# Patient Record
Sex: MALE | Employment: FULL TIME | URBAN - METROPOLITAN AREA
[De-identification: names, ages, dates, MRNs, and addresses within clinical notes are randomized per-mention and may not be internally consistent; named-entity substitution may affect disease eponyms.]

---

## 2020-01-06 ENCOUNTER — TELEPHONE (OUTPATIENT)
Dept: GASTROENTEROLOGY | Facility: CLINIC | Age: 55
End: 2020-01-06

## 2020-01-06 NOTE — TELEPHONE ENCOUNTER
----- Message from Marlon Jain RN sent at 2/18/2016  2:11 PM CST -----  Regarding: recall colon  Recall colon in 5 years per GS.  Colon done 2/6/15

## 2020-02-25 ENCOUNTER — OFFICE VISIT (OUTPATIENT)
Dept: FAMILY MEDICINE CLINIC | Facility: CLINIC | Age: 55
End: 2020-02-25

## 2020-02-25 ENCOUNTER — LAB ENCOUNTER (OUTPATIENT)
Dept: LAB | Age: 55
End: 2020-02-25
Attending: FAMILY MEDICINE
Payer: COMMERCIAL

## 2020-02-25 ENCOUNTER — APPOINTMENT (OUTPATIENT)
Dept: LAB | Age: 55
End: 2020-02-25
Attending: FAMILY MEDICINE
Payer: COMMERCIAL

## 2020-02-25 VITALS
DIASTOLIC BLOOD PRESSURE: 78 MMHG | HEIGHT: 67 IN | TEMPERATURE: 98 F | HEART RATE: 76 BPM | BODY MASS INDEX: 28.16 KG/M2 | WEIGHT: 179.38 LBS | SYSTOLIC BLOOD PRESSURE: 124 MMHG

## 2020-02-25 DIAGNOSIS — Z00.00 PHYSICAL EXAM: ICD-10-CM

## 2020-02-25 DIAGNOSIS — Z12.11 COLON CANCER SCREENING: ICD-10-CM

## 2020-02-25 DIAGNOSIS — I83.93 VARICOSE VEINS OF BOTH LOWER EXTREMITIES, UNSPECIFIED WHETHER COMPLICATED: ICD-10-CM

## 2020-02-25 DIAGNOSIS — Z00.00 PHYSICAL EXAM: Primary | ICD-10-CM

## 2020-02-25 LAB
ALBUMIN SERPL-MCNC: 4.1 G/DL (ref 3.4–5)
ALBUMIN/GLOB SERPL: 1.2 {RATIO} (ref 1–2)
ALP LIVER SERPL-CCNC: 61 U/L (ref 45–117)
ALT SERPL-CCNC: 43 U/L (ref 16–61)
ANION GAP SERPL CALC-SCNC: 4 MMOL/L (ref 0–18)
AST SERPL-CCNC: 23 U/L (ref 15–37)
BACTERIA UR QL AUTO: NEGATIVE /HPF
BASOPHILS # BLD AUTO: 0.02 X10(3) UL (ref 0–0.2)
BASOPHILS NFR BLD AUTO: 0.4 %
BILIRUB SERPL-MCNC: 1.9 MG/DL (ref 0.1–2)
BILIRUB UR QL: NEGATIVE
BUN BLD-MCNC: 17 MG/DL (ref 7–18)
BUN/CREAT SERPL: 14 (ref 10–20)
CALCIUM BLD-MCNC: 9.7 MG/DL (ref 8.5–10.1)
CHLORIDE SERPL-SCNC: 107 MMOL/L (ref 98–112)
CHOLEST SMN-MCNC: 201 MG/DL (ref ?–200)
CO2 SERPL-SCNC: 29 MMOL/L (ref 21–32)
COLOR UR: YELLOW
CREAT BLD-MCNC: 1.21 MG/DL (ref 0.7–1.3)
DEPRECATED RDW RBC AUTO: 41.6 FL (ref 35.1–46.3)
EOSINOPHIL # BLD AUTO: 0.07 X10(3) UL (ref 0–0.7)
EOSINOPHIL NFR BLD AUTO: 1.4 %
ERYTHROCYTE [DISTWIDTH] IN BLOOD BY AUTOMATED COUNT: 12.1 % (ref 11–15)
EST. AVERAGE GLUCOSE BLD GHB EST-MCNC: 100 MG/DL (ref 68–126)
GLOBULIN PLAS-MCNC: 3.3 G/DL (ref 2.8–4.4)
GLUCOSE BLD-MCNC: 94 MG/DL (ref 70–99)
GLUCOSE UR-MCNC: NEGATIVE MG/DL
HBA1C MFR BLD HPLC: 5.1 % (ref ?–5.7)
HCT VFR BLD AUTO: 46.2 % (ref 39–53)
HDLC SERPL-MCNC: 44 MG/DL (ref 40–59)
HGB BLD-MCNC: 15.8 G/DL (ref 13–17.5)
HGB UR QL STRIP.AUTO: NEGATIVE
IMM GRANULOCYTES # BLD AUTO: 0.03 X10(3) UL (ref 0–1)
IMM GRANULOCYTES NFR BLD: 0.6 %
KETONES UR-MCNC: NEGATIVE MG/DL
LDLC SERPL CALC-MCNC: 134 MG/DL (ref ?–100)
LEUKOCYTE ESTERASE UR QL STRIP.AUTO: NEGATIVE
LYMPHOCYTES # BLD AUTO: 1.04 X10(3) UL (ref 1–4)
LYMPHOCYTES NFR BLD AUTO: 20.7 %
M PROTEIN MFR SERPL ELPH: 7.4 G/DL (ref 6.4–8.2)
MCH RBC QN AUTO: 31.8 PG (ref 26–34)
MCHC RBC AUTO-ENTMCNC: 34.2 G/DL (ref 31–37)
MCV RBC AUTO: 93 FL (ref 80–100)
MONOCYTES # BLD AUTO: 0.38 X10(3) UL (ref 0.1–1)
MONOCYTES NFR BLD AUTO: 7.6 %
NEUTROPHILS # BLD AUTO: 3.48 X10 (3) UL (ref 1.5–7.7)
NEUTROPHILS # BLD AUTO: 3.48 X10(3) UL (ref 1.5–7.7)
NEUTROPHILS NFR BLD AUTO: 69.3 %
NITRITE UR QL STRIP.AUTO: NEGATIVE
NONHDLC SERPL-MCNC: 157 MG/DL (ref ?–130)
OSMOLALITY SERPL CALC.SUM OF ELEC: 291 MOSM/KG (ref 275–295)
PATIENT FASTING Y/N/NP: YES
PATIENT FASTING Y/N/NP: YES
PH UR: 7 [PH] (ref 5–8)
PLATELET # BLD AUTO: 140 10(3)UL (ref 150–450)
POTASSIUM SERPL-SCNC: 5 MMOL/L (ref 3.5–5.1)
PROT UR-MCNC: NEGATIVE MG/DL
PSA SERPL-MCNC: 1.71 NG/ML (ref ?–4)
RBC # BLD AUTO: 4.97 X10(6)UL (ref 4.3–5.7)
RBC #/AREA URNS AUTO: 0 /HPF
SODIUM SERPL-SCNC: 140 MMOL/L (ref 136–145)
SP GR UR STRIP: 1.02 (ref 1–1.03)
T4 FREE SERPL-MCNC: 0.9 NG/DL (ref 0.8–1.7)
TRIGL SERPL-MCNC: 117 MG/DL (ref 30–149)
TSI SER-ACNC: 4.21 MIU/ML (ref 0.36–3.74)
UROBILINOGEN UR STRIP-ACNC: <2
VLDLC SERPL CALC-MCNC: 23 MG/DL (ref 0–30)
WBC # BLD AUTO: 5 X10(3) UL (ref 4–11)
WBC #/AREA URNS AUTO: 1 /HPF

## 2020-02-25 PROCEDURE — 99396 PREV VISIT EST AGE 40-64: CPT | Performed by: FAMILY MEDICINE

## 2020-02-25 PROCEDURE — 93010 ELECTROCARDIOGRAM REPORT: CPT | Performed by: FAMILY MEDICINE

## 2020-02-25 PROCEDURE — 84439 ASSAY OF FREE THYROXINE: CPT

## 2020-02-25 PROCEDURE — 81015 MICROSCOPIC EXAM OF URINE: CPT | Performed by: FAMILY MEDICINE

## 2020-02-25 PROCEDURE — 80053 COMPREHEN METABOLIC PANEL: CPT

## 2020-02-25 PROCEDURE — 82306 VITAMIN D 25 HYDROXY: CPT | Performed by: FAMILY MEDICINE

## 2020-02-25 PROCEDURE — 36415 COLL VENOUS BLD VENIPUNCTURE: CPT

## 2020-02-25 PROCEDURE — 90471 IMMUNIZATION ADMIN: CPT | Performed by: FAMILY MEDICINE

## 2020-02-25 PROCEDURE — G0438 PPPS, INITIAL VISIT: HCPCS | Performed by: FAMILY MEDICINE

## 2020-02-25 PROCEDURE — 85025 COMPLETE CBC W/AUTO DIFF WBC: CPT

## 2020-02-25 PROCEDURE — 81001 URINALYSIS AUTO W/SCOPE: CPT | Performed by: FAMILY MEDICINE

## 2020-02-25 PROCEDURE — 83036 HEMOGLOBIN GLYCOSYLATED A1C: CPT

## 2020-02-25 PROCEDURE — 84443 ASSAY THYROID STIM HORMONE: CPT

## 2020-02-25 PROCEDURE — 93005 ELECTROCARDIOGRAM TRACING: CPT

## 2020-02-25 PROCEDURE — 84153 ASSAY OF PSA TOTAL: CPT | Performed by: FAMILY MEDICINE

## 2020-02-25 PROCEDURE — 90715 TDAP VACCINE 7 YRS/> IM: CPT | Performed by: FAMILY MEDICINE

## 2020-02-25 PROCEDURE — 80061 LIPID PANEL: CPT

## 2020-02-25 NOTE — PROGRESS NOTES
REASON FOR VISIT:    Ignacia Moser is a 53year old male who presents for an [address]. Ignacia Moser is a 53year old male is here for routine periodic health screening and examination. His last physical exam was 3 years ago.   His last Screening Recommended screening varies with age, risk and gender LDL Cholesterol (mg/dL)   Date Value   04/05/2014 131 (H)       Diabetes Screening  If history of high blood pressure or other  risk factors No results found for: A1C  GLUCOSE (P) (mg/dL)   D iritis 2010    OD, Traumatic Iritis (Hit with a baseball on 08/08/2010   • Varicose veins       No past surgical history on file.    Family History   Problem Relation Age of Onset   • Diabetes Neg    • Glaucoma Neg    • Macular degeneration Neg    • Other ( cyanosis, clubbing or edema, + varicose veins L>R LEs  NEURO: Oriented times three, cranial nerves are intact, motor and sensory are grossly intact    ASSESSMENT AND OTHER RELEVANT CHRONIC CONDITIONS:   [place] is a 53year old male who presents for disease. Best to abstain from sexual intercourse until he is ready to form a family. Use of condoms may prevent transmission of infections as well as pregnancy. FOLLOW-UP: Schedule a follow-up visit in 12 months.     The patient indicates understanding o

## 2020-02-26 LAB — 25(OH)D3 SERPL-MCNC: 13.4 NG/ML (ref 30–100)

## 2020-02-26 RX ORDER — ERGOCALCIFEROL 1.25 MG/1
50000 CAPSULE ORAL WEEKLY
Qty: 12 CAPSULE | Refills: 4 | Status: SHIPPED | OUTPATIENT
Start: 2020-02-26 | End: 2020-03-27

## 2020-09-25 ENCOUNTER — IMMUNIZATION (OUTPATIENT)
Dept: FAMILY MEDICINE CLINIC | Facility: CLINIC | Age: 55
End: 2020-09-25

## 2020-09-25 DIAGNOSIS — Z23 NEED FOR VACCINATION: ICD-10-CM

## 2020-09-25 PROCEDURE — 90686 IIV4 VACC NO PRSV 0.5 ML IM: CPT | Performed by: FAMILY MEDICINE

## 2020-09-25 PROCEDURE — 90471 IMMUNIZATION ADMIN: CPT | Performed by: FAMILY MEDICINE

## 2021-01-13 ENCOUNTER — TELEPHONE (OUTPATIENT)
Dept: GASTROENTEROLOGY | Facility: CLINIC | Age: 56
End: 2021-01-13

## 2021-01-13 DIAGNOSIS — Z86.010 HISTORY OF COLON POLYPS: Primary | ICD-10-CM

## 2021-01-13 RX ORDER — SODIUM, POTASSIUM,MAG SULFATES 17.5-3.13G
SOLUTION, RECONSTITUTED, ORAL ORAL
Qty: 1 BOTTLE | Refills: 0 | Status: ON HOLD | OUTPATIENT
Start: 2021-01-13 | End: 2021-04-20

## 2021-01-13 NOTE — TELEPHONE ENCOUNTER
The patient's chart has been reviewed. Okay to schedule pt for 5 year CLN recall r/t hx colon polyps with Dr. Jay Harmon.      Advise IV [place] or MAC sedation with split dose Suprep or Colyte/TriLyte or equivalent(eRx) preparation.   -Eligible for NE:

## 2021-01-13 NOTE — TELEPHONE ENCOUNTER
Last Procedure, Date, MD:  02/06/2015 with Dr. Bentley Kidd @ [facility]    Last Diagnosis:  Colorectal cancer screening    Recalled for (mth/yrs): 5 years    Sedation used previously:   IV Fentanyl 75 mcg IV  Versed 7 mg IV in divided doses    Last Prep Used (i

## 2021-01-20 NOTE — TELEPHONE ENCOUNTER
Scheduled for:  Colonoscopy 76246  Provider Name:  Dr Iliana Walters  Date:  04/20/2021  Location:  [facility]  Sedation: MAC    Time:  0900 (pt is aware to arrive at 0800)  Prep:  Suprep  Meds/Allergies Reconciled?:  Physician reviewed  Diagnosis with codes:  Hx o

## 2021-04-17 ENCOUNTER — LAB ENCOUNTER (OUTPATIENT)
Dept: LAB | Age: 56
End: 2021-04-17
Attending: INTERNAL MEDICINE
Payer: COMMERCIAL

## 2021-04-17 DIAGNOSIS — Z01.818 PRE-OP TESTING: ICD-10-CM

## 2021-04-20 ENCOUNTER — ANESTHESIA EVENT (OUTPATIENT)
Dept: ENDOSCOPY | Facility: HOSPITAL | Age: 56
End: 2021-04-20
Payer: COMMERCIAL

## 2021-04-20 ENCOUNTER — ANESTHESIA (OUTPATIENT)
Dept: ENDOSCOPY | Facility: HOSPITAL | Age: 56
End: 2021-04-20
Payer: COMMERCIAL

## 2021-04-20 ENCOUNTER — HOSPITAL ENCOUNTER (OUTPATIENT)
Facility: HOSPITAL | Age: 56
Setting detail: HOSPITAL OUTPATIENT SURGERY
Discharge: HOME | End: 2021-04-20
Attending: INTERNAL MEDICINE | Admitting: INTERNAL MEDICINE
Payer: COMMERCIAL

## 2021-04-20 VITALS
DIASTOLIC BLOOD PRESSURE: 74 MMHG | TEMPERATURE: 97 F | WEIGHT: 175 LBS | HEIGHT: 67 IN | RESPIRATION RATE: 20 BRPM | HEART RATE: 57 BPM | OXYGEN SATURATION: 98 % | BODY MASS INDEX: 27.47 KG/M2 | SYSTOLIC BLOOD PRESSURE: 108 MMHG

## 2021-04-20 DIAGNOSIS — Z86.010 HISTORY OF COLON POLYPS: ICD-10-CM

## 2021-04-20 DIAGNOSIS — K57.90 DIVERTICULOSIS: ICD-10-CM

## 2021-04-20 DIAGNOSIS — Z01.818 PRE-OP TESTING: Primary | ICD-10-CM

## 2021-04-20 DIAGNOSIS — Z12.11 ENCOUNTER FOR SCREENING COLONOSCOPY: ICD-10-CM

## 2021-04-20 DIAGNOSIS — K63.5 POLYP OF ASCENDING COLON, UNSPECIFIED TYPE: ICD-10-CM

## 2021-04-20 PROCEDURE — 0DBK8ZX EXCISION OF ASCENDING COLON, VIA NATURAL OR ARTIFICIAL OPENING ENDOSCOPIC, DIAGNOSTIC: ICD-10-PCS | Performed by: INTERNAL MEDICINE

## 2021-04-20 PROCEDURE — 45385 COLONOSCOPY W/LESION REMOVAL: CPT | Performed by: INTERNAL MEDICINE

## 2021-04-20 RX ORDER — SODIUM CHLORIDE, SODIUM LACTATE, POTASSIUM CHLORIDE, CALCIUM CHLORIDE 600; 310; 30; 20 MG/100ML; MG/100ML; MG/100ML; MG/100ML
INJECTION, SOLUTION INTRAVENOUS CONTINUOUS
Status: DISCONTINUED | OUTPATIENT
Start: 2021-04-20 | End: 2021-04-20

## 2021-04-20 RX ORDER — LIDOCAINE HYDROCHLORIDE 10 MG/ML
INJECTION, SOLUTION EPIDURAL; INFILTRATION; INTRACAUDAL; PERINEURAL AS NEEDED
Status: DISCONTINUED | OUTPATIENT
Start: 2021-04-20 | End: 2021-04-20 | Stop reason: SURG

## 2021-04-20 RX ORDER — NALOXONE HYDROCHLORIDE 0.4 MG/ML
80 INJECTION, SOLUTION INTRAMUSCULAR; INTRAVENOUS; SUBCUTANEOUS AS NEEDED
Status: DISCONTINUED | OUTPATIENT
Start: 2021-04-20 | End: 2021-04-20

## 2021-04-20 RX ADMIN — SODIUM CHLORIDE, SODIUM LACTATE, POTASSIUM CHLORIDE, CALCIUM CHLORIDE: 600; 310; 30; 20 INJECTION, SOLUTION INTRAVENOUS at 09:50:00

## 2021-04-20 RX ADMIN — SODIUM CHLORIDE, SODIUM LACTATE, POTASSIUM CHLORIDE, CALCIUM CHLORIDE: 600; 310; 30; 20 INJECTION, SOLUTION INTRAVENOUS at 09:22:00

## 2021-04-20 RX ADMIN — LIDOCAINE HYDROCHLORIDE 50 MG: 10 INJECTION, SOLUTION EPIDURAL; INFILTRATION; INTRACAUDAL; PERINEURAL at 09:24:00

## 2021-04-20 NOTE — ANESTHESIA POSTPROCEDURE EVALUATION
Patient: Almita    Procedure Summary     Date: 04/20/21 Room / Location: [address] [place] [address] ENDOSCOPY 04 / [address] [place] [address] ENDOSCOPY    Anesthesia Start: 3582 Anesthesia Stop:     Procedure: COLONOSCOPY (N/A ) Diagnosis:       History of colon polyps      (colon polyp, DIVE

## 2021-04-20 NOTE — ANESTHESIA PREPROCEDURE EVALUATION
Anesthesia PreOp Note    HPI:     Almita is a 63year old male who presents for preoperative consultation requested by: Lela Fragoso MD    Date of Surgery: 4/20/2021    Procedure(s):  COLONOSCOPY  Indication: History of colon polyps    Re Smoker      Smokeless tobacco: Never Used    Vaping Use      Vaping Use: Never used    Substance and Sexual Activity      Alcohol use:  Yes        Alcohol/week: 1.7 standard drinks        Types: 2 Cans of beer per week        Comment: Beer, Occasionally blood pressure is 128/82 and his pulse is 79. His respiration is 18 and oxygen saturation is 99%.     04/20/21  0829   BP: 128/82   Pulse: 79   Resp: 18   Temp: 96.8 °F (36 °C)   TempSrc: Oral   SpO2: 99%   Weight: 79.4 kg (175 lb)   Height: 1.702 m (5' 7\"

## 2021-04-20 NOTE — OPERATIVE REPORT
[facility] Endoscopy Report      Date of Procedure:  04/20/21      Preoperative Diagnosis:  1. Colorectal cancer screening  2. Personal history of adenomatous colon polyp      Postoperative Diagnosis:  1. Colon polyp  2.   Sigmoid colon d was well tolerated without immediate complication. Impression:  1. Diminutive cecal polyp  2. Sigmoid colon diverticulosis, currently uncomplicated    Recommendations:  1. High-fiber diet. 2.  Follow-up biopsy results.   3.  Probable surveillance c

## 2021-04-20 NOTE — H&P
History & Physical Examination    Patient Name: Camila Howell  MRN: [id number]  CSN: [id number]  YOB: 1965    Diagnosis: Colorectal cancer screening, personal history of adenomatous colon polyp      Na Sulfate-K Sulfate-Mg Sulf (SUPREP TOREY

## 2021-04-21 NOTE — PROGRESS NOTES
I spoke to the patient. He is feeling well. He had a solitary subcentimeter tubular adenoma removed. Uncomplicated diverticulosis was present. I have recommended a high-fiber diet and a surveillance colonoscopy in 7 years. GI RNs: Please enter colonosco

## 2021-04-22 ENCOUNTER — TELEPHONE (OUTPATIENT)
Dept: GASTROENTEROLOGY | Facility: CLINIC | Age: 56
End: 2021-04-22

## 2021-04-22 NOTE — TELEPHONE ENCOUNTER
Health Maintenance Updated. 7 year colonoscopy recall entered into patient outreach in [address] [place] [address] Rd. Next colonoscopy will be due 4/20/2028.

## 2021-04-22 NOTE — TELEPHONE ENCOUNTER
----- Message from Tha Falcon MD sent at 4/21/2021  5:46 PM CDT -----  I spoke to the patient. He is feeling well. He had a solitary subcentimeter tubular adenoma removed. Uncomplicated diverticulosis was present.   I have recommended a high-fi

## 2021-11-03 ENCOUNTER — IMMUNIZATION (OUTPATIENT)
Dept: FAMILY MEDICINE CLINIC | Facility: CLINIC | Age: 56
End: 2021-11-03

## 2021-11-03 DIAGNOSIS — Z23 NEED FOR VACCINATION: Primary | ICD-10-CM

## 2021-11-03 PROCEDURE — 90686 IIV4 VACC NO PRSV 0.5 ML IM: CPT | Performed by: FAMILY MEDICINE

## 2021-11-03 PROCEDURE — 90471 IMMUNIZATION ADMIN: CPT | Performed by: FAMILY MEDICINE

## 2022-07-14 ENCOUNTER — LAB ENCOUNTER (OUTPATIENT)
Dept: LAB | Age: 57
End: 2022-07-14
Attending: FAMILY MEDICINE
Payer: COMMERCIAL

## 2022-07-14 ENCOUNTER — OFFICE VISIT (OUTPATIENT)
Dept: FAMILY MEDICINE CLINIC | Facility: CLINIC | Age: 57
End: 2022-07-14
Payer: COMMERCIAL

## 2022-07-14 VITALS
HEART RATE: 63 BPM | WEIGHT: 173.63 LBS | HEIGHT: 67 IN | DIASTOLIC BLOOD PRESSURE: 76 MMHG | BODY MASS INDEX: 27.25 KG/M2 | SYSTOLIC BLOOD PRESSURE: 126 MMHG

## 2022-07-14 DIAGNOSIS — Z00.00 PHYSICAL EXAM: Primary | ICD-10-CM

## 2022-07-14 DIAGNOSIS — Z00.00 PHYSICAL EXAM: ICD-10-CM

## 2022-07-14 DIAGNOSIS — I83.12 VARICOSE VEINS OF LEFT LOWER EXTREMITY WITH INFLAMMATION: ICD-10-CM

## 2022-07-14 LAB
ALBUMIN SERPL-MCNC: 4.3 G/DL (ref 3.4–5)
ALBUMIN/GLOB SERPL: 1.3 {RATIO} (ref 1–2)
ALP LIVER SERPL-CCNC: 66 U/L
ALT SERPL-CCNC: 31 U/L
ANION GAP SERPL CALC-SCNC: 6 MMOL/L (ref 0–18)
AST SERPL-CCNC: 21 U/L (ref 15–37)
BASOPHILS # BLD AUTO: 0.03 X10(3) UL (ref 0–0.2)
BASOPHILS NFR BLD AUTO: 0.6 %
BILIRUB SERPL-MCNC: 2.2 MG/DL (ref 0.1–2)
BILIRUB UR QL: NEGATIVE
BUN BLD-MCNC: 15 MG/DL (ref 7–18)
BUN/CREAT SERPL: 11.7 (ref 10–20)
CALCIUM BLD-MCNC: 9.9 MG/DL (ref 8.5–10.1)
CHLORIDE SERPL-SCNC: 106 MMOL/L (ref 98–112)
CHOLEST SERPL-MCNC: 184 MG/DL (ref ?–200)
CLARITY UR: CLEAR
CO2 SERPL-SCNC: 28 MMOL/L (ref 21–32)
COLOR UR: YELLOW
CREAT BLD-MCNC: 1.28 MG/DL
DEPRECATED RDW RBC AUTO: 42.2 FL (ref 35.1–46.3)
EOSINOPHIL # BLD AUTO: 0.05 X10(3) UL (ref 0–0.7)
EOSINOPHIL NFR BLD AUTO: 1 %
ERYTHROCYTE [DISTWIDTH] IN BLOOD BY AUTOMATED COUNT: 11.9 % (ref 11–15)
EST. AVERAGE GLUCOSE BLD GHB EST-MCNC: 103 MG/DL (ref 68–126)
FASTING PATIENT LIPID ANSWER: YES
FASTING STATUS PATIENT QL REPORTED: YES
GLOBULIN PLAS-MCNC: 3.4 G/DL (ref 2.8–4.4)
GLUCOSE BLD-MCNC: 98 MG/DL (ref 70–99)
GLUCOSE UR-MCNC: NEGATIVE MG/DL
HBA1C MFR BLD: 5.2 % (ref ?–5.7)
HCT VFR BLD AUTO: 51.8 %
HDLC SERPL-MCNC: 50 MG/DL (ref 40–59)
HGB BLD-MCNC: 16.9 G/DL
HGB UR QL STRIP.AUTO: NEGATIVE
IMM GRANULOCYTES # BLD AUTO: 0.02 X10(3) UL (ref 0–1)
IMM GRANULOCYTES NFR BLD: 0.4 %
KETONES UR-MCNC: NEGATIVE MG/DL
LDLC SERPL CALC-MCNC: 124 MG/DL (ref ?–100)
LEUKOCYTE ESTERASE UR QL STRIP.AUTO: NEGATIVE
LYMPHOCYTES # BLD AUTO: 1 X10(3) UL (ref 1–4)
LYMPHOCYTES NFR BLD AUTO: 19.5 %
MCH RBC QN AUTO: 31.5 PG (ref 26–34)
MCHC RBC AUTO-ENTMCNC: 32.6 G/DL (ref 31–37)
MCV RBC AUTO: 96.5 FL
MONOCYTES # BLD AUTO: 0.43 X10(3) UL (ref 0.1–1)
MONOCYTES NFR BLD AUTO: 8.4 %
NEUTROPHILS # BLD AUTO: 3.61 X10 (3) UL (ref 1.5–7.7)
NEUTROPHILS # BLD AUTO: 3.61 X10(3) UL (ref 1.5–7.7)
NEUTROPHILS NFR BLD AUTO: 70.1 %
NITRITE UR QL STRIP.AUTO: NEGATIVE
NONHDLC SERPL-MCNC: 134 MG/DL (ref ?–130)
OSMOLALITY SERPL CALC.SUM OF ELEC: 291 MOSM/KG (ref 275–295)
PH UR: 7.5 [PH] (ref 5–8)
PLATELET # BLD AUTO: 145 10(3)UL (ref 150–450)
POTASSIUM SERPL-SCNC: 4.8 MMOL/L (ref 3.5–5.1)
PROT SERPL-MCNC: 7.7 G/DL (ref 6.4–8.2)
PROT UR-MCNC: NEGATIVE MG/DL
PSA SERPL-MCNC: 2.51 NG/ML (ref ?–4)
RBC # BLD AUTO: 5.37 X10(6)UL
SODIUM SERPL-SCNC: 140 MMOL/L (ref 136–145)
SP GR UR STRIP: 1.02 (ref 1–1.03)
T4 FREE SERPL-MCNC: 1 NG/DL (ref 0.8–1.7)
TRIGL SERPL-MCNC: 52 MG/DL (ref 30–149)
TSI SER-ACNC: 4.68 MIU/ML (ref 0.36–3.74)
UROBILINOGEN UR STRIP-ACNC: 0.2
VIT D+METAB SERPL-MCNC: 18.9 NG/ML (ref 30–100)
VLDLC SERPL CALC-MCNC: 9 MG/DL (ref 0–30)
WBC # BLD AUTO: 5.1 X10(3) UL (ref 4–11)

## 2022-07-14 PROCEDURE — 84443 ASSAY THYROID STIM HORMONE: CPT

## 2022-07-14 PROCEDURE — 99396 PREV VISIT EST AGE 40-64: CPT | Performed by: FAMILY MEDICINE

## 2022-07-14 PROCEDURE — 83036 HEMOGLOBIN GLYCOSYLATED A1C: CPT

## 2022-07-14 PROCEDURE — 84439 ASSAY OF FREE THYROXINE: CPT

## 2022-07-14 PROCEDURE — 90471 IMMUNIZATION ADMIN: CPT | Performed by: FAMILY MEDICINE

## 2022-07-14 PROCEDURE — 85025 COMPLETE CBC W/AUTO DIFF WBC: CPT

## 2022-07-14 PROCEDURE — 93010 ELECTROCARDIOGRAM REPORT: CPT | Performed by: FAMILY MEDICINE

## 2022-07-14 PROCEDURE — 81003 URINALYSIS AUTO W/O SCOPE: CPT

## 2022-07-14 PROCEDURE — 90750 HZV VACC RECOMBINANT IM: CPT | Performed by: FAMILY MEDICINE

## 2022-07-14 PROCEDURE — 82306 VITAMIN D 25 HYDROXY: CPT

## 2022-07-14 PROCEDURE — 84153 ASSAY OF PSA TOTAL: CPT

## 2022-07-14 PROCEDURE — 3074F SYST BP LT 130 MM HG: CPT | Performed by: FAMILY MEDICINE

## 2022-07-14 PROCEDURE — 80053 COMPREHEN METABOLIC PANEL: CPT

## 2022-07-14 PROCEDURE — 3008F BODY MASS INDEX DOCD: CPT | Performed by: FAMILY MEDICINE

## 2022-07-14 PROCEDURE — 36415 COLL VENOUS BLD VENIPUNCTURE: CPT

## 2022-07-14 PROCEDURE — 80061 LIPID PANEL: CPT

## 2022-07-14 PROCEDURE — 3078F DIAST BP <80 MM HG: CPT | Performed by: FAMILY MEDICINE

## 2022-07-14 PROCEDURE — 93005 ELECTROCARDIOGRAM TRACING: CPT

## 2022-07-14 RX ORDER — ERGOCALCIFEROL 1.25 MG/1
50000 CAPSULE ORAL WEEKLY
Qty: 12 CAPSULE | Refills: 4 | Status: SHIPPED | OUTPATIENT
Start: 2022-07-14 | End: 2022-08-13

## 2022-07-15 ENCOUNTER — TELEPHONE (OUTPATIENT)
Dept: FAMILY MEDICINE CLINIC | Facility: CLINIC | Age: 57
End: 2022-07-15

## 2022-07-19 ENCOUNTER — OFFICE VISIT (OUTPATIENT)
Dept: FAMILY MEDICINE CLINIC | Facility: CLINIC | Age: 57
End: 2022-07-19
Payer: COMMERCIAL

## 2022-07-19 VITALS
HEIGHT: 67 IN | DIASTOLIC BLOOD PRESSURE: 79 MMHG | HEART RATE: 64 BPM | WEIGHT: 174.81 LBS | SYSTOLIC BLOOD PRESSURE: 136 MMHG | BODY MASS INDEX: 27.44 KG/M2

## 2022-07-19 DIAGNOSIS — B07.8 COMMON WART: Primary | ICD-10-CM

## 2022-07-19 PROCEDURE — 3008F BODY MASS INDEX DOCD: CPT | Performed by: FAMILY MEDICINE

## 2022-07-19 PROCEDURE — 3078F DIAST BP <80 MM HG: CPT | Performed by: FAMILY MEDICINE

## 2022-07-19 PROCEDURE — 17110 DESTRUCTION B9 LES UP TO 14: CPT | Performed by: FAMILY MEDICINE

## 2022-07-19 PROCEDURE — 3075F SYST BP GE 130 - 139MM HG: CPT | Performed by: FAMILY MEDICINE

## 2022-09-16 ENCOUNTER — NURSE ONLY (OUTPATIENT)
Dept: FAMILY MEDICINE CLINIC | Facility: CLINIC | Age: 57
End: 2022-09-16
Payer: COMMERCIAL

## 2022-09-16 DIAGNOSIS — Z23 NEED FOR SHINGLES VACCINE: Primary | ICD-10-CM

## 2022-09-16 PROCEDURE — 90471 IMMUNIZATION ADMIN: CPT | Performed by: FAMILY MEDICINE

## 2022-09-16 PROCEDURE — 90750 HZV VACC RECOMBINANT IM: CPT | Performed by: FAMILY MEDICINE

## 2022-09-16 NOTE — PROGRESS NOTES
Paty Nova is here for second shingles vaccine. Patients full name, , and vaccine verified. Patient tolerated vaccine well.

## 2024-09-29 ENCOUNTER — HOSPITAL ENCOUNTER (OUTPATIENT)
Age: 59
Discharge: HOME | End: 2024-09-29
Payer: COMMERCIAL

## 2024-09-29 ENCOUNTER — APPOINTMENT (OUTPATIENT)
Dept: GENERAL RADIOLOGY | Age: 59
End: 2024-09-29
Payer: COMMERCIAL

## 2024-09-29 VITALS
TEMPERATURE: 98 F | DIASTOLIC BLOOD PRESSURE: 68 MMHG | HEART RATE: 81 BPM | OXYGEN SATURATION: 97 % | RESPIRATION RATE: 18 BRPM | SYSTOLIC BLOOD PRESSURE: 124 MMHG

## 2024-09-29 DIAGNOSIS — Z20.822 COVID-19 RULED OUT BY LABORATORY TESTING: ICD-10-CM

## 2024-09-29 DIAGNOSIS — R05.2 SUBACUTE COUGH: ICD-10-CM

## 2024-09-29 DIAGNOSIS — J18.9 COMMUNITY ACQUIRED PNEUMONIA OF RIGHT LOWER LOBE OF LUNG: Primary | ICD-10-CM

## 2024-09-29 LAB
S PYO AG THROAT QL: NEGATIVE
SARS-COV-2 RNA RESP QL NAA+PROBE: NOT DETECTED

## 2024-09-29 PROCEDURE — 71046 X-RAY EXAM CHEST 2 VIEWS: CPT

## 2024-09-29 PROCEDURE — U0002 COVID-19 LAB TEST NON-CDC: HCPCS

## 2024-09-29 PROCEDURE — 87880 STREP A ASSAY W/OPTIC: CPT

## 2024-09-29 PROCEDURE — 99203 OFFICE O/P NEW LOW 30 MIN: CPT

## 2024-09-29 RX ORDER — BENZONATATE 100 MG/1
100 CAPSULE ORAL 3 TIMES DAILY PRN
Qty: 30 CAPSULE | Refills: 0 | Status: SHIPPED | OUTPATIENT
Start: 2024-09-29 | End: 2024-09-29

## 2024-09-29 RX ORDER — BENZONATATE 100 MG/1
100 CAPSULE ORAL 3 TIMES DAILY PRN
Qty: 30 CAPSULE | Refills: 0 | Status: SHIPPED | OUTPATIENT
Start: 2024-09-29 | End: 2024-10-29

## 2024-09-29 RX ORDER — AZITHROMYCIN 250 MG/1
TABLET, FILM COATED ORAL
Qty: 6 TABLET | Refills: 0 | Status: SHIPPED | OUTPATIENT
Start: 2024-09-29 | End: 2024-10-04

## 2024-09-29 RX ORDER — AZITHROMYCIN 250 MG/1
TABLET, FILM COATED ORAL
Qty: 6 TABLET | Refills: 0 | Status: SHIPPED | OUTPATIENT
Start: 2024-09-29 | End: 2024-09-29

## 2024-09-29 NOTE — DISCHARGE INSTRUCTIONS
Strep test was negative.  COVID test was negative.  Chest x-ray shows pneumonia in your right lower lung.  Please take the antibiotics as prescribed.  I also sent a prescription for Tessalon Perles for the cough.  If you develop a fever, chest pain, shortness of breath or any other concerning complaints you should go to the emergency department.  Otherwise follow-up with your primary care doctor next week.

## 2024-09-29 NOTE — ED PROVIDER NOTES
Patient Seen in: [facility]      History     Chief Complaint   Patient presents with    Cough    Sore Throat    Body ache and/or chills     Stated Complaint: Fever  Subjective:   Eder is a 59-year-old male presenting to the immediate care complaining of cough, fever, body aches, chills and a sore throat for the past 5 days.  Patient states that he has not had any episodes of respiratory distress or difficulty breathing.  Fever has been subjective, no measured temperature.  States that he has a mild pain when he swallows.  No difficulty swallowing or voice changes.  States the cough is bothering him the most.  He denies any chest pain, shortness of breath, abdominal pain or vomiting.  He is eating and drinking well and is well-hydrated.  He denies any concerns or complaints.        Objective:   Past Medical History:    Colon polyps    fu colonoscopy 2020    Lipid screening    Other and unspecified hyperlipidemia    Traumatic iritis    OD, Traumatic Iritis (Hit with a baseball on 08/08/2010    Varicose veins            Past Surgical History:   Procedure Laterality Date    Colonoscopy N/A 4/20/2021    Procedure: COLONOSCOPY;  Surgeon: Conor Krishnamurthy MD;  Location: [facility] ENDOSCOPY              Social History     Socioeconomic History    Marital status:    Tobacco Use    Smoking status: Former    Smokeless tobacco: Never   Vaping Use    Vaping status: Never Used   Substance and Sexual Activity    Alcohol use: Yes     Alcohol/week: 1.7 standard drinks of alcohol     Types: 2 Cans of beer per week     Comment: Beer, Occasionally    Drug use: No   Other Topics Concern    Caffeine Concern Yes     Comment: Coffee, soda, 1 cup daily            Review of Systems    Positive for stated complaint: Cough, Sore Throat, and Body ache and/or chills    Other systems are as noted in HPI.  Constitutional and vital signs reviewed.      All other systems reviewed and negative except as noted  above.    Physical Exam     ED Triage Vitals [09/29/24 1245]   /68   Pulse 81   Resp 18   Temp 97.6 °F (36.4 °C)   Temp src Temporal   SpO2 97 %   O2 Device None (Room air)     Current:/68   Pulse 81   Temp 97.6 °F (36.4 °C) (Temporal)   Resp 18   SpO2 97%     Physical Exam  Vitals and nursing note reviewed.   Constitutional:       General: He is not in acute distress.     Appearance: Normal appearance. He is not ill-appearing, toxic-appearing or diaphoretic.   HENT:      Head: Normocephalic.      Right Ear: Tympanic membrane, ear canal and external ear normal.      Left Ear: Tympanic membrane, ear canal and external ear normal.      Nose: Nose normal.      Mouth/Throat:      Mouth: Mucous membranes are moist. No oral lesions.      Pharynx: Oropharynx is clear. Uvula midline. Posterior oropharyngeal erythema present. No pharyngeal swelling, oropharyngeal exudate or uvula swelling.      Tonsils: No tonsillar exudate or tonsillar abscesses. 1+ on the right. 1+ on the left.      Comments: No trismus  Eyes:      Conjunctiva/sclera: Conjunctivae normal.   Cardiovascular:      Rate and Rhythm: Normal rate and regular rhythm.      Pulses: Normal pulses.      Heart sounds: Normal heart sounds.   Pulmonary:      Effort: Pulmonary effort is normal. No tachypnea, bradypnea, accessory muscle usage, prolonged expiration, respiratory distress or retractions.      Breath sounds: Normal breath sounds and air entry. No stridor, decreased air movement or transmitted upper airway sounds. No decreased breath sounds, wheezing, rhonchi or rales.   Abdominal:      General: Abdomen is flat.   Musculoskeletal:         General: Normal range of motion.      Cervical back: Normal range of motion.   Skin:     General: Skin is warm.      Capillary Refill: Capillary refill takes less than 2 seconds.   Neurological:      General: No focal deficit present.      Mental Status: He is alert and oriented to person, place, and time.    Psychiatric:         Mood and Affect: Mood normal.         Behavior: Behavior normal.         Thought Content: Thought content normal.         Judgment: Judgment normal.         ED Course   Radiology:  XR CHEST PA + LAT CHEST (CPT=71046)    Result Date: 9/29/2024  CONCLUSION:   Mild right lower lobe pneumonia.  Recommend short-term follow-up chest radiographs to document imaging resolution.    Dictated by (CST): Trenton Ruiz MD on 9/29/2024 at 1:21 PM     Finalized by (CST): Trenton Ruiz MD on 9/29/2024 at 1:22 PM         Labs Reviewed   POCT RAPID STREP - Normal   RAPID SARS-COV-2 BY PCR - Normal       MDM     Medical Decision Making  Differential diagnoses reflecting the complexity of care include but are not limited to strep, pneumonia, COVID, URI.    Comorbidities that add complexity to management include: None  History obtained by an independent source was from: Patient  My independent interpretations of studies include: X-ray  Patient is well appearing, non-toxic and in no acute distress.  Vital signs are stable.   Strep test was negative.  COVID test was negative.  Chest x-ray shows a right lower lobe pneumonia.  Will treat for community-acquired pneumonia.  Sent prescriptions for Augmentin and azithromycin.  Also sent a prescription for Tessalon Perles for the cough.   Also recommended Flonase, Mucinex and Claritin for congestion as needed.   Recommended that if the patient develops a fever that does not resolve with medication, chest pain, shortness of breath or any other concerning complaints they should go to the emergency department.  Otherwise follow-up with primary care doctor next week.  ED precautions discussed.  Patient (guardian) advised to follow up with PCP in 2-3 days.  Patient (guardian) agrees with this plan of care.  Patient (guardian) verbalizes understanding of discharge instructions and plan of care.      Amount and/or Complexity of Data Reviewed  Labs: ordered. Decision-making  details documented in ED Course.  Radiology: ordered and independent interpretation performed. Decision-making details documented in ED Course.    Risk  OTC drugs.  Prescription drug management.        Disposition and Plan     Clinical Impression:  1. Community acquired pneumonia of right lower lobe of lung    2. COVID-19 ruled out by laboratory testing    3. Subacute cough         Disposition:  Discharge  9/29/2024  1:28 pm    Follow-up:  Pedro Jeong MD  [address] [place] [address]  [phone]                Medications Prescribed:  Discharge Medication List as of 9/29/2024  1:28 PM        START taking these medications    Details   amoxicillin clavulanate 875-125 MG Oral Tab Take 1 tablet by mouth 2 (two) times daily for 10 days., Normal, Disp-20 tablet, R-0      azithromycin (ZITHROMAX Z-ANTIONETTE) 250 MG Oral Tab 500 mg once followed by 250 mg daily x 4 days, Normal, Disp-6 tablet, R-0      benzonatate 100 MG Oral Cap Take 1 capsule (100 mg total) by mouth 3 (three) times daily as needed for cough., Normal, Disp-30 capsule, R-0

## 2024-10-22 ENCOUNTER — LAB ENCOUNTER (OUTPATIENT)
Dept: LAB | Age: 59
End: 2024-10-22
Attending: FAMILY MEDICINE
Payer: COMMERCIAL

## 2024-10-22 ENCOUNTER — OFFICE VISIT (OUTPATIENT)
Dept: FAMILY MEDICINE CLINIC | Facility: CLINIC | Age: 59
End: 2024-10-22

## 2024-10-22 ENCOUNTER — HOSPITAL ENCOUNTER (OUTPATIENT)
Dept: GENERAL RADIOLOGY | Age: 59
Discharge: HOME | End: 2024-10-22
Attending: FAMILY MEDICINE
Payer: COMMERCIAL

## 2024-10-22 VITALS
WEIGHT: 163 LBS | DIASTOLIC BLOOD PRESSURE: 78 MMHG | HEART RATE: 69 BPM | SYSTOLIC BLOOD PRESSURE: 138 MMHG | BODY MASS INDEX: 25.58 KG/M2 | HEIGHT: 67 IN

## 2024-10-22 DIAGNOSIS — J18.9 PNEUMONIA OF RIGHT LOWER LOBE DUE TO INFECTIOUS ORGANISM: ICD-10-CM

## 2024-10-22 DIAGNOSIS — Z00.00 PHYSICAL EXAM: Primary | ICD-10-CM

## 2024-10-22 DIAGNOSIS — R63.4 WEIGHT LOSS: ICD-10-CM

## 2024-10-22 LAB
ALBUMIN SERPL-MCNC: 4.7 G/DL (ref 3.2–4.8)
ALBUMIN/GLOB SERPL: 1.5 {RATIO} (ref 1–2)
ALP LIVER SERPL-CCNC: 61 U/L
ALT SERPL-CCNC: 29 U/L
ANION GAP SERPL CALC-SCNC: 5 MMOL/L (ref 0–18)
AST SERPL-CCNC: 27 U/L (ref ?–34)
BASOPHILS # BLD AUTO: 0.01 X10(3) UL (ref 0–0.2)
BASOPHILS NFR BLD AUTO: 0.2 %
BILIRUB SERPL-MCNC: 2.3 MG/DL (ref 0.2–1.1)
BILIRUB UR QL: NEGATIVE
BUN BLD-MCNC: 19 MG/DL (ref 9–23)
BUN/CREAT SERPL: 16.5 (ref 10–20)
CALCIUM BLD-MCNC: 11.1 MG/DL (ref 8.7–10.4)
CHLORIDE SERPL-SCNC: 110 MMOL/L (ref 98–112)
CHOLEST SERPL-MCNC: 190 MG/DL (ref ?–200)
CLARITY UR: CLEAR
CO2 SERPL-SCNC: 29 MMOL/L (ref 21–32)
COLOR UR: YELLOW
CREAT BLD-MCNC: 1.15 MG/DL
DEPRECATED RDW RBC AUTO: 43.3 FL (ref 35.1–46.3)
EGFRCR SERPLBLD CKD-EPI 2021: 73 ML/MIN/1.73M2 (ref 60–?)
EOSINOPHIL # BLD AUTO: 0.03 X10(3) UL (ref 0–0.7)
EOSINOPHIL NFR BLD AUTO: 0.7 %
ERYTHROCYTE [DISTWIDTH] IN BLOOD BY AUTOMATED COUNT: 12.2 % (ref 11–15)
FASTING PATIENT LIPID ANSWER: YES
FASTING STATUS PATIENT QL REPORTED: YES
GLOBULIN PLAS-MCNC: 3.1 G/DL (ref 2–3.5)
GLUCOSE BLD-MCNC: 104 MG/DL (ref 70–99)
GLUCOSE UR-MCNC: NORMAL MG/DL
HCT VFR BLD AUTO: 48.5 %
HCV AB SERPL QL IA: NONREACTIVE
HDLC SERPL-MCNC: 51 MG/DL (ref 40–59)
HGB BLD-MCNC: 16 G/DL
HGB UR QL STRIP.AUTO: NEGATIVE
IMM GRANULOCYTES # BLD AUTO: 0.02 X10(3) UL (ref 0–1)
IMM GRANULOCYTES NFR BLD: 0.5 %
KETONES UR-MCNC: NEGATIVE MG/DL
LDLC SERPL CALC-MCNC: 129 MG/DL (ref ?–100)
LEUKOCYTE ESTERASE UR QL STRIP.AUTO: NEGATIVE
LYMPHOCYTES # BLD AUTO: 0.95 X10(3) UL (ref 1–4)
LYMPHOCYTES NFR BLD AUTO: 22.2 %
MCH RBC QN AUTO: 31.5 PG (ref 26–34)
MCHC RBC AUTO-ENTMCNC: 33 G/DL (ref 31–37)
MCV RBC AUTO: 95.5 FL
MONOCYTES # BLD AUTO: 0.35 X10(3) UL (ref 0.1–1)
MONOCYTES NFR BLD AUTO: 8.2 %
NEUTROPHILS # BLD AUTO: 2.91 X10 (3) UL (ref 1.5–7.7)
NEUTROPHILS # BLD AUTO: 2.91 X10(3) UL (ref 1.5–7.7)
NEUTROPHILS NFR BLD AUTO: 68.2 %
NITRITE UR QL STRIP.AUTO: NEGATIVE
NONHDLC SERPL-MCNC: 139 MG/DL (ref ?–130)
OSMOLALITY SERPL CALC.SUM OF ELEC: 301 MOSM/KG (ref 275–295)
PH UR: 5.5 [PH] (ref 5–8)
PLATELET # BLD AUTO: 130 10(3)UL (ref 150–450)
PLATELETS.RETICULATED NFR BLD AUTO: 4.3 % (ref 0–7)
POTASSIUM SERPL-SCNC: 5 MMOL/L (ref 3.5–5.1)
PROT SERPL-MCNC: 7.8 G/DL (ref 5.7–8.2)
RBC # BLD AUTO: 5.08 X10(6)UL
SODIUM SERPL-SCNC: 144 MMOL/L (ref 136–145)
SP GR UR STRIP: 1.03 (ref 1–1.03)
TRIGL SERPL-MCNC: 55 MG/DL (ref 30–149)
TSI SER-ACNC: 3.56 MIU/ML (ref 0.55–4.78)
UROBILINOGEN UR STRIP-ACNC: NORMAL
VIT D+METAB SERPL-MCNC: 22.8 NG/ML (ref 30–100)
VLDLC SERPL CALC-MCNC: 10 MG/DL (ref 0–30)
WBC # BLD AUTO: 4.3 X10(3) UL (ref 4–11)

## 2024-10-22 PROCEDURE — 3075F SYST BP GE 130 - 139MM HG: CPT | Performed by: FAMILY MEDICINE

## 2024-10-22 PROCEDURE — 3078F DIAST BP <80 MM HG: CPT | Performed by: FAMILY MEDICINE

## 2024-10-22 PROCEDURE — 90677 PCV20 VACCINE IM: CPT | Performed by: FAMILY MEDICINE

## 2024-10-22 PROCEDURE — 81003 URINALYSIS AUTO W/O SCOPE: CPT

## 2024-10-22 PROCEDURE — 84154 ASSAY OF PSA FREE: CPT

## 2024-10-22 PROCEDURE — 84443 ASSAY THYROID STIM HORMONE: CPT

## 2024-10-22 PROCEDURE — 80061 LIPID PANEL: CPT

## 2024-10-22 PROCEDURE — 99213 OFFICE O/P EST LOW 20 MIN: CPT | Performed by: FAMILY MEDICINE

## 2024-10-22 PROCEDURE — 85025 COMPLETE CBC W/AUTO DIFF WBC: CPT

## 2024-10-22 PROCEDURE — 80053 COMPREHEN METABOLIC PANEL: CPT

## 2024-10-22 PROCEDURE — 84153 ASSAY OF PSA TOTAL: CPT

## 2024-10-22 PROCEDURE — 82306 VITAMIN D 25 HYDROXY: CPT

## 2024-10-22 PROCEDURE — 90656 IIV3 VACC NO PRSV 0.5 ML IM: CPT | Performed by: FAMILY MEDICINE

## 2024-10-22 PROCEDURE — 86480 TB TEST CELL IMMUN MEASURE: CPT

## 2024-10-22 PROCEDURE — 90471 IMMUNIZATION ADMIN: CPT | Performed by: FAMILY MEDICINE

## 2024-10-22 PROCEDURE — 71046 X-RAY EXAM CHEST 2 VIEWS: CPT | Performed by: FAMILY MEDICINE

## 2024-10-22 PROCEDURE — 86803 HEPATITIS C AB TEST: CPT

## 2024-10-22 PROCEDURE — 3008F BODY MASS INDEX DOCD: CPT | Performed by: FAMILY MEDICINE

## 2024-10-22 PROCEDURE — 83036 HEMOGLOBIN GLYCOSYLATED A1C: CPT

## 2024-10-22 PROCEDURE — 99396 PREV VISIT EST AGE 40-64: CPT | Performed by: FAMILY MEDICINE

## 2024-10-22 PROCEDURE — 36415 COLL VENOUS BLD VENIPUNCTURE: CPT

## 2024-10-22 PROCEDURE — 90472 IMMUNIZATION ADMIN EACH ADD: CPT | Performed by: FAMILY MEDICINE

## 2024-10-22 NOTE — PROGRESS NOTES
10/22/2024  9:56 AM    Eder Ruiz is a 59 year old male.    Chief complaint(s):   Chief Complaint   Patient presents with    Routine Physical     Fasting      HPI:     Eder Ruiz primary complaint is regarding CPE.     Eder Ruiz is a 59 year old male is here for routine periodic health screening and examination.  His last physical exam was 2 years ago.  His last ECG was 5 years ago and was normal. His last diabetes screening test was 2 years ago and was normal.  His last cholesterol test was 2 year ago and was abnormal: Hx hyperlipidemia.  Last dentist visit was 6 months ago. He is current with his Td immunization, 2020. Patient last colonoscopy was 3 year ago. None smoker.       HISTORY:  Past Medical History:    Colon polyps    fu colonoscopy 2020    Lipid screening    Other and unspecified hyperlipidemia    Traumatic iritis    OD, Traumatic Iritis (Hit with a baseball on 08/08/2010    Varicose veins      Past Surgical History:   Procedure Laterality Date    Colonoscopy N/A 4/20/2021    Procedure: COLONOSCOPY;  Surgeon: Conor Krishnamurthy MD;  Location: [facility] ENDOSCOPY      Family History   Problem Relation Age of Onset    Other (No cancer-colon) Other         Close relative    Prostate Cancer Paternal Grandfather     Diabetes Neg     Glaucoma Neg     Macular degeneration Neg       Social History:   Social History     Socioeconomic History    Marital status:    Tobacco Use    Smoking status: Former    Smokeless tobacco: Never   Vaping Use    Vaping status: Never Used   Substance and Sexual Activity    Alcohol use: Yes     Alcohol/week: 1.7 standard drinks of alcohol     Types: 2 Cans of beer per week     Comment: Beer, Occasionally    Drug use: No   Other Topics Concern    Caffeine Concern Yes     Comment: Coffee, soda, 1 cup daily        Immunizations:   Immunization History   Administered Date(s) Administered    >=3 YRS FLUZONE OR FLUARIX QUAD PRESERVE FREE SINGLE DOSE (00431) FLU CLINIC  10/20/2014    Covid-19 Vaccine Moderna 100 mcg/0.5 ml 04/02/2021, 04/30/2021    Covid-19 Vaccine Moderna 50 Mcg/0.25 Ml 11/27/2021    FLULAVAL 6 months & older 0.5 ml Prefilled syringe (11203) 09/25/2020, 11/03/2021    FLUZONE 6 months and older PFS 0.5 ml (17858) 10/20/2014, 09/25/2020, 11/03/2021    TD 08/25/2007    TDAP 08/17/2011, 02/25/2020    Zoster Vaccine Recombinant Adjuvanted (Shingrix) 07/14/2022, 09/16/2022   Pended Date(s) Pended    Influenza Vaccine, trivalent (IIV3), PF 0.5mL (53894) 10/22/2024    Pneumococcal Conjugate PCV20 10/22/2024       Medications (Active prior to today's visit):  Current Outpatient Medications   Medication Sig Dispense Refill    benzonatate 100 MG Oral Cap Take 1 capsule (100 mg total) by mouth 3 (three) times daily as needed for cough. (Patient not taking: Reported on 10/22/2024) 30 capsule 0       Allergies:  Allergies[1]      ROS:   Review of Systems   Constitutional:  Positive for unexpected weight change (loss). Negative for appetite change, fatigue and fever.   HENT:  Negative for hearing loss and nosebleeds.    Eyes:  Negative for pain and visual disturbance.   Respiratory:  Negative for apnea, cough and shortness of breath.    Cardiovascular:  Negative for chest pain, palpitations and leg swelling.   Gastrointestinal:  Negative for abdominal pain, blood in stool, constipation, diarrhea, nausea and vomiting.   Endocrine: Negative for polydipsia and polyuria.   Genitourinary:  Negative for decreased urine volume, frequency and hematuria.        No nocturia   Musculoskeletal:  Negative for arthralgias.   Skin:  Negative for rash.   Neurological:  Negative for dizziness, syncope and headaches.   Psychiatric/Behavioral:  Negative for dysphoric mood and sleep disturbance.        PHYSICAL EXAM:   VS: /78   Pulse 69   Ht 5' 7\" (1.702 m)   Wt 163 lb (73.9 kg)   BMI 25.53 kg/m²     Physical Exam  Vitals reviewed.   Constitutional:       Appearance: Normal appearance.       Comments:      HENT:      Head: Normocephalic.      Right Ear: Hearing, tympanic membrane and ear canal normal.      Left Ear: Hearing, tympanic membrane and ear canal normal.      Nose: Nose normal. No rhinorrhea.      Mouth/Throat:      Mouth: Mucous membranes are moist.      Pharynx: Oropharynx is clear.   Eyes:      Extraocular Movements: Extraocular movements intact.      Conjunctiva/sclera: Conjunctivae normal.      Pupils: Pupils are equal, round, and reactive to light.   Neck:      Thyroid: No thyroid mass.      Vascular: No carotid bruit.   Cardiovascular:      Rate and Rhythm: Normal rate and regular rhythm.      Heart sounds: Normal heart sounds, S1 normal and S2 normal. No murmur heard.  Pulmonary:      Effort: Pulmonary effort is normal.      Breath sounds: Normal breath sounds.   Abdominal:      General: Abdomen is flat. Bowel sounds are normal.      Palpations: Abdomen is soft. There is no hepatomegaly, splenomegaly or mass.      Tenderness: There is no abdominal tenderness.      Hernia: No hernia is present.   Musculoskeletal:      Cervical back: Neck supple.      Comments: Spinal exam without scoliosis.      Lymphadenopathy:      Cervical: No cervical adenopathy.   Skin:     General: Skin is warm.      Findings: No rash.   Neurological:      General: No focal deficit present.      Mental Status: He is alert.      Deep Tendon Reflexes:      Reflex Scores:       Patellar reflexes are 2+ on the right side and 2+ on the left side.  Psychiatric:         Attention and Perception: Attention normal.         Mood and Affect: Mood and affect normal.         LABORATORY RESULTS:     EKG / Spirometry : -     Radiology: XR CHEST PA + LAT CHEST (CPT=71046)    Result Date: 9/29/2024  PROCEDURE: XR CHEST PA + LAT CHEST (CPT=71046)  COMPARISON: None.  INDICATIONS: Cough, fever and sore throat for 5 days.  TECHNIQUE:   Two views.   FINDINGS: CARDIAC/VASC: No cardiac silhouette abnormality or cardiomegaly.   Unremarkable pulmonary vasculature.  MEDIAST/VINCENT: No visible mass or adenopathy. LUNGS/PLEURA: Mild right lower lobe opacity.  No pleural effusion.  No pneumothorax. BONES: Degenerative change thoracic spine. OTHER: Negative.          CONCLUSION:   Mild right lower lobe pneumonia.  Recommend short-term follow-up chest radiographs to document imaging resolution.    Dictated by (CST): Trenton Ruiz MD on 9/29/2024 at 1:21 PM     Finalized by (CST): Trenton Ruiz MD on 9/29/2024 at 1:22 PM             ASSESSMENT/PLAN:   Assessment   Encounter Diagnoses   Name Primary?    Physical exam Yes    Pneumonia of right lower lobe due to infectious organism     Weight loss        1. Physical exam      CPE PLAN:    LABS / TEST & ORDERS for today's visit :Blood test(s) ordered today for send out to [facility] lab:  Orders Placed This Encounter   Procedures    CBC With Differential With Platelet    Comp Metabolic Panel (14)    Hemoglobin A1C    Lipid Panel    PSA, Total W Reflex To Free    TSH W Reflex To Free T4    Vitamin D    Urinalysis with Culture Reflex    HEPATITIS C AB TEST    Quantiferon TB Plus    INFLUENZA VACCINE, TRI, PRESERV FREE, 0.5 ML    Prevnar 20 (PCV20) [43003]    Referrals: INFLUENZA VACCINE, TRI, PRESERV FREE, 0.5 ML  PCV20 VACCINE FOR INTRAMUSCULAR USE  XR CHEST PA + LAT CHEST (CPT=71046)  In-House; Urine dip.  Test/Procedures done today include: EKG.    IMMUNIZATIONS: Flu vaccine, given today.    RECOMMENDATIONS given include: ANTICIPATORY GUIDANCE  topics covered today include: safety (i.e. seat belts, helmets, sunscreen, protective sports gear ), nutrition (i.e. healthy meals and snacks (i.e. avoid junk food and high-carbohydrate foods); athletic conditioning, fluids; low fat milk, limit to less than 20 oz. a day; dental care ), and Healthy habits& Social competence & Responsibilities: Recommendations on physical activity; exercise daily or at least 3 times a week for 30-60 minutes doing  cardiovascular exercise. Encourage to maintain the best physical and dental hygiene possible.  Consider a  if overweight and/or having difficult in staying active; attempt to keep a schedule that includes an adequate sleep and physical exercise / activities Patient educated on doing regular self testicular exam. Patient was educated on sexual transmitted disease. Best to abstain from sexual intercourse until he is ready to form a family. Use of condoms may prevent transmission of infections as well as pregnancy.    FOLLOW-UP: Schedule a follow-up visit in 12 months.     2. Pneumonia of right lower lobe due to infectious organism  3. Weight loss    MEDICATIONS:     Requested Prescriptions      No prescriptions requested or ordered in this encounter             LABORATORY & ORDERS:   Orders Placed This Encounter   Procedures    CBC With Differential With Platelet    Comp Metabolic Panel (14)    Hemoglobin A1C    Lipid Panel    PSA, Total W Reflex To Free    TSH W Reflex To Free T4    Vitamin D    Urinalysis with Culture Reflex    HEPATITIS C AB TEST    Quantiferon TB Plus    INFLUENZA VACCINE, TRI, PRESERV FREE, 0.5 ML    Prevnar 20 (PCV20) [33200]       REFERRALS: INFLUENZA VACCINE, TRI, PRESERV FREE, 0.5 ML  PCV20 VACCINE FOR INTRAMUSCULAR USE,       Procedures    CBC With Differential With Platelet    Comp Metabolic Panel (14)                                Quantiferon TB Plus    XR CHEST PA + LAT CHEST (CPT=71046)        RECOMMENDATIONS given include: Patient was reassured of  his medical condition and all questions and concerns were answered. Patient was informed to please, call our office with any new or further questions or concerns that may come up in the near future. Notify Dr Jeong or the [facility] if there is a deterioration or worsening of the medical condition. Also, inform the doctor with any new symptoms or medications' side effects.      FOLLOW-UP: Schedule a follow-up visit in  3 weeks .              Orders This Visit:  Orders Placed This Encounter   Procedures    CBC With Differential With Platelet    Comp Metabolic Panel (14)    Hemoglobin A1C    Lipid Panel    PSA, Total W Reflex To Free    TSH W Reflex To Free T4    Vitamin D    Urinalysis with Culture Reflex    HEPATITIS C AB TEST    Quantiferon TB Plus    INFLUENZA VACCINE, TRI, PRESERV FREE, 0.5 ML    Prevnar 20 (PCV20) [12137]       Meds This Visit:  Requested Prescriptions      No prescriptions requested or ordered in this encounter       Imaging & Referrals:  INFLUENZA VACCINE, TRI, PRESERV FREE, 0.5 ML  PCV20 VACCINE FOR INTRAMUSCULAR USE  XR CHEST PA + LAT CHEST (CPT=71046)         BATOOL LOMBARDI MD         [1] No Known Allergies

## 2024-10-23 ENCOUNTER — TELEPHONE (OUTPATIENT)
Dept: FAMILY MEDICINE CLINIC | Facility: CLINIC | Age: 59
End: 2024-10-23

## 2024-10-23 DIAGNOSIS — R97.20 ELEVATED PSA: Primary | ICD-10-CM

## 2024-10-23 LAB
%FPSA REFLEX: 16.9 %
%FPSA REFLEX: 16.9 %
EST. AVERAGE GLUCOSE BLD GHB EST-MCNC: 105 MG/DL (ref 68–126)
HBA1C MFR BLD: 5.3 % (ref ?–5.7)
PROSTATE SPECIFIC AG: 4.2 NG/ML
PROSTATE SPECIFIC AG: 4.2 NG/ML
PSA, FREE: 0.71 NG/ML
PSA, FREE: 0.71 NG/ML

## 2024-10-26 LAB
M TB IFN-G CD4+ T-CELLS BLD-ACNC: 0.02 IU/ML
M TB TUBERC IFN-G BLD QL: NEGATIVE
M TB TUBERC IGNF/MITOGEN IGNF CONTROL: >10 IU/ML
QFT TB1 AG MINUS NIL: -0.02 IU/ML
QFT TB2 AG MINUS NIL: 0 IU/ML

## 2024-11-12 ENCOUNTER — OFFICE VISIT (OUTPATIENT)
Dept: FAMILY MEDICINE CLINIC | Facility: CLINIC | Age: 59
End: 2024-11-12

## 2024-11-12 VITALS
WEIGHT: 168.63 LBS | BODY MASS INDEX: 26.47 KG/M2 | SYSTOLIC BLOOD PRESSURE: 127 MMHG | DIASTOLIC BLOOD PRESSURE: 72 MMHG | HEIGHT: 67 IN | HEART RATE: 69 BPM

## 2024-11-12 DIAGNOSIS — E83.52 HYPERCALCEMIA: ICD-10-CM

## 2024-11-12 DIAGNOSIS — E55.9 VITAMIN D DEFICIENCY: ICD-10-CM

## 2024-11-12 DIAGNOSIS — R97.20 ELEVATED PSA: Primary | ICD-10-CM

## 2024-11-12 PROCEDURE — 99213 OFFICE O/P EST LOW 20 MIN: CPT | Performed by: FAMILY MEDICINE

## 2024-11-12 PROCEDURE — 3078F DIAST BP <80 MM HG: CPT | Performed by: FAMILY MEDICINE

## 2024-11-12 PROCEDURE — 3008F BODY MASS INDEX DOCD: CPT | Performed by: FAMILY MEDICINE

## 2024-11-12 PROCEDURE — 3074F SYST BP LT 130 MM HG: CPT | Performed by: FAMILY MEDICINE

## 2024-11-12 NOTE — PROGRESS NOTES
11/12/2024  1:35 PM    Eder Ruiz is a 59 year old male.    Chief complaint(s):   Chief Complaint   Patient presents with    Weight Loss    Blood Pressure     HPI:     Eder Ruiz primary complaint is regarding as above.     Patient is a 59-year-old male who presents for follow-up after he completed his examination.  Laboratory shows to have an elevated PSA just minimally about 4 and denies any dysuria, difficulty emptying the bladder, hematuria.  Has never had a PSA elevation in the past.  Denies any prostate issue in the past as well.  Also had elevated calcium about 11.  Last year was above 10 so slowly moving up.  Denies any history of renal stones.  Denies taking any supplements with calcium.  And finally his vitamin D levels were low so a prescription was generated for vitamin D which she is taking every week.      HISTORY:  Past Medical History:    Colon polyps    fu colonoscopy 2020    Lipid screening    Other and unspecified hyperlipidemia    Traumatic iritis    OD, Traumatic Iritis (Hit with a baseball on 08/08/2010    Varicose veins      Past Surgical History:   Procedure Laterality Date    Colonoscopy N/A 4/20/2021    Procedure: COLONOSCOPY;  Surgeon: Conor Krishnamurthy MD;  Location: [facility] ENDOSCOPY      Family History   Problem Relation Age of Onset    Other (No cancer-colon) Other         Close relative    Prostate Cancer Paternal Grandfather     Diabetes Neg     Glaucoma Neg     Macular degeneration Neg       Social History:   Social History     Socioeconomic History    Marital status:    Tobacco Use    Smoking status: Former    Smokeless tobacco: Never   Vaping Use    Vaping status: Never Used   Substance and Sexual Activity    Alcohol use: Yes     Alcohol/week: 1.7 standard drinks of alcohol     Types: 2 Cans of beer per week     Comment: Beer, Occasionally    Drug use: No   Other Topics Concern    Caffeine Concern Yes     Comment: Coffee, soda, 1 cup daily        Immunizations:    Immunization History   Administered Date(s) Administered    >=3 YRS FLUZONE OR FLUARIX QUAD PRESERVE FREE SINGLE DOSE (61217) FLU CLINIC 10/20/2014    Covid-19 Vaccine Moderna 100 mcg/0.5 ml 04/02/2021, 04/30/2021    Covid-19 Vaccine Moderna 50 Mcg/0.25 Ml 11/27/2021    FLULAVAL 6 months & older 0.5 ml Prefilled syringe (45011) 09/25/2020, 11/03/2021    FLUZONE 6 months and older PFS 0.5 ml (65099) 10/20/2014, 09/25/2020, 11/03/2021    Influenza Vaccine, trivalent (IIV3), PF 0.5mL (82532) 10/22/2024    Pneumococcal Conjugate PCV20 10/22/2024    TD 08/25/2007    TDAP 08/17/2011, 02/25/2020    Zoster Vaccine Recombinant Adjuvanted (Shingrix) 07/14/2022, 09/16/2022       Medications (Active prior to today's visit):  Current Outpatient Medications   Medication Sig Dispense Refill    Cholecalciferol (VITAMIN D3) 1.25 MG (90462 UT) Oral Tab Take 50,000 Units by mouth once a week. Take 1 tab po Q week for 8 weeks then 1 tab po Q month for 10 months 10 tablet 3       Allergies:  Allergies[1]      ROS:   Review of Systems   Constitutional:  Negative for appetite change, fatigue and fever.   Respiratory:  Negative for shortness of breath.    Cardiovascular:  Negative for chest pain.   Gastrointestinal:  Negative for abdominal pain.   Musculoskeletal:  Negative for myalgias.   Skin:  Negative for rash.   Neurological:  Negative for dizziness, weakness and headaches.       PHYSICAL EXAM:   VS: /72   Pulse 69   Ht 5' 7\" (1.702 m)   Wt 168 lb 9.6 oz (76.5 kg)   BMI 26.41 kg/m²     Physical Exam  Vitals reviewed.   Constitutional:       Appearance: Normal appearance. He is well-developed.   HENT:      Head: Normocephalic.   Eyes:      General: No scleral icterus.     Conjunctiva/sclera: Conjunctivae normal.   Cardiovascular:      Rate and Rhythm: Normal rate.   Pulmonary:      Effort: Pulmonary effort is normal.   Musculoskeletal:      Cervical back: Neck supple.   Skin:     Findings: No rash.   Psychiatric:          Mood and Affect: Mood normal.         LABORATORY RESULTS:   No results found for: \"URCOLOR\", \"URCLA\", \"URINELEUK\", \"URINENITRITE\", \"URINEBLOOD\"   Results for orders placed or performed in visit on 10/22/24   CBC With Differential With Platelet    Collection Time: 10/22/24 11:06 AM   Result Value Ref Range    WBC 4.3 4.0 - 11.0 x10(3) uL    RBC 5.08 4.30 - 5.70 x10(6)uL    HGB 16.0 13.0 - 17.5 g/dL    HCT 48.5 39.0 - 53.0 %    MCV 95.5 80.0 - 100.0 fL    MCH 31.5 26.0 - 34.0 pg    MCHC 33.0 31.0 - 37.0 g/dL    RDW-SD 43.3 35.1 - 46.3 fL    RDW 12.2 11.0 - 15.0 %    .0 (L) 150.0 - 450.0 10(3)uL    Immature Platelet Fraction 4.3 0.0 - 7.0 %    Neutrophil Absolute Prelim 2.91 1.50 - 7.70 x10 (3) uL    Neutrophil Absolute 2.91 1.50 - 7.70 x10(3) uL    Lymphocyte Absolute 0.95 (L) 1.00 - 4.00 x10(3) uL    Monocyte Absolute 0.35 0.10 - 1.00 x10(3) uL    Eosinophil Absolute 0.03 0.00 - 0.70 x10(3) uL    Basophil Absolute 0.01 0.00 - 0.20 x10(3) uL    Immature Granulocyte Absolute 0.02 0.00 - 1.00 x10(3) uL    Neutrophil % 68.2 %    Lymphocyte % 22.2 %    Monocyte % 8.2 %    Eosinophil % 0.7 %    Basophil % 0.2 %    Immature Granulocyte % 0.5 %   Comp Metabolic Panel (14)    Collection Time: 10/22/24 11:06 AM   Result Value Ref Range    Glucose 104 (H) 70 - 99 mg/dL    Sodium 144 136 - 145 mmol/L    Potassium 5.0 3.5 - 5.1 mmol/L    Chloride 110 98 - 112 mmol/L    CO2 29.0 21.0 - 32.0 mmol/L    Anion Gap 5 0 - 18 mmol/L    BUN 19 9 - 23 mg/dL    Creatinine 1.15 0.70 - 1.30 mg/dL    BUN/CREA Ratio 16.5 10.0 - 20.0    Calcium, Total 11.1 (H) 8.7 - 10.4 mg/dL    Calculated Osmolality 301 (H) 275 - 295 mOsm/kg    eGFR-Cr 73 >=60 mL/min/1.73m2    ALT 29 10 - 49 U/L    AST 27 <34 U/L    Alkaline Phosphatase 61 45 - 117 U/L    Bilirubin, Total 2.3 (H) 0.2 - 1.1 mg/dL    Total Protein 7.8 5.7 - 8.2 g/dL    Albumin 4.7 3.2 - 4.8 g/dL    Globulin  3.1 2.0 - 3.5 g/dL    A/G Ratio 1.5 1.0 - 2.0    Patient Fasting for CMP? Yes     Hemoglobin A1C    Collection Time: 10/22/24 11:06 AM   Result Value Ref Range    HgbA1C 5.3 <5.7 %    Estimated Average Glucose 105 68 - 126 mg/dL   Lipid Panel    Collection Time: 10/22/24 11:06 AM   Result Value Ref Range    Cholesterol, Total 190 <200 mg/dL    HDL Cholesterol 51 40 - 59 mg/dL    Triglycerides 55 30 - 149 mg/dL    LDL Cholesterol 129 (H) <100 mg/dL    VLDL 10 0 - 30 mg/dL    Non HDL Chol 139 (H) <130 mg/dL    Patient Fasting for Lipid? Yes    PSA, Total W Reflex To Free    Collection Time: 10/22/24 11:06 AM   Result Value Ref Range    Prostate Specific Antigen 4.2 (H) 0.0 - 4.0 ng/mL    Reflex Criteria Comment    TSH W Reflex To Free T4    Collection Time: 10/22/24 11:06 AM   Result Value Ref Range    TSH 3.557 0.550 - 4.780 mIU/mL   Urinalysis with Culture Reflex    Collection Time: 10/22/24 11:06 AM    Specimen: Urine   Result Value Ref Range    Urine Color Yellow Yellow    Clarity Urine Clear Clear    Spec Gravity 1.030 1.005 - 1.030    Glucose Urine Normal Normal mg/dL    Bilirubin Urine Negative Negative    Ketones Urine Negative Negative mg/dL    Blood Urine Negative Negative    pH Urine 5.5 5.0 - 8.0    Protein Urine Trace (A) Negative mg/dL    Urobilinogen Urine Normal Normal    Nitrite Urine Negative Negative    Leukocyte Esterase Urine Negative Negative    Microscopic Microscopic not indicated    Quantiferon TB Plus    Collection Time: 10/22/24 11:06 AM   Result Value Ref Range    Quantiferon TB NIL 0.02 IU/mL    Quantiferon TB1 minus NIL -0.02 IU/mL    Quantiferon TB2 minus NIL 0.00 IU/mL    Quantiferon TB Mitogen minus NIL >10.00 IU/mL    Quantiferon TB Result Negative Negative   HCV Antibody    Collection Time: 10/22/24 11:06 AM   Result Value Ref Range    Hepatitis C Virus Nonreactive Nonreactive    Vitamin D, 25-Hydroxy    Collection Time: 10/22/24 11:06 AM   Result Value Ref Range    Vitamin D, 25OH, Total 22.8 (L) 30.0 - 100.0 ng/mL   PSA FREE    Collection Time: 10/22/24 11:06  AM   Result Value Ref Range    % Free PSA 16.9 %    PSA, Free 0.71 N/A ng/mL       EKG / Spirometry : -     Radiology: XR CHEST PA + LAT CHEST (CPT=71046)    Result Date: 10/22/2024         PROCEDURE: XR CHEST PA + LAT CHEST (CPT=71046)  COMPARISON: [facility], XR CHEST PA + LAT CHEST (CPT=71046), 9/29/2024, 1:01 PM.  INDICATIONS: History of pneumonia of right lower lobe due to infectious organism.  TECHNIQUE:   Two views.   FINDINGS: Lung volumes are normal    Dictated by (CST): Shemar Keith MD on 10/22/2024 at 11:57 AM     Finalized by (CST): Shemar Keith MD on 10/22/2024 at 12:00 PM             ASSESSMENT/PLAN:   Assessment   Encounter Diagnoses   Name Primary?    Elevated PSA Yes    Hypercalcemia     Vitamin D deficiency          MEDICATIONS:      Cholecalciferol (VITAMIN D3) 1.25 MG (83742 UT) Oral Tab, Take 50,000 Units by mouth once a week. Take 1 tab po Q week for 8 weeks then 1 tab po Q month for 10 months, Disp: 10 tablet, Rfl: 3       LABORATORY & ORDERS:  repeat labs in 4 months  Orders Placed This Encounter   Procedures    Calcium [E]   PSA levels                        RECOMMENDATIONS given include: Patient was reassured of  his medical condition and all questions and concerns were answered. Patient was informed to please, call our office with any new or further questions or concerns that may come up in the near future. Notify Dr Lombardi or the [facility] if there is a deterioration or worsening of the medical condition. Also, inform the doctor with any new symptoms or medications' side effects.      FOLLOW-UP: Schedule a follow-up visit in  prn.                 Orders This Visit:  Orders Placed This Encounter   Procedures    Calcium [E]       Meds This Visit:  Requested Prescriptions      No prescriptions requested or ordered in this encounter       Imaging & Referrals:  None         BATOOL LOMBARDI MD         [1] No Known Allergies

## 2025-02-15 ENCOUNTER — LAB ENCOUNTER (OUTPATIENT)
Dept: LAB | Age: 60
End: 2025-02-15
Attending: FAMILY MEDICINE
Payer: COMMERCIAL

## 2025-02-15 DIAGNOSIS — R97.20 ELEVATED PSA: ICD-10-CM

## 2025-02-15 DIAGNOSIS — E83.52 HYPERCALCEMIA: ICD-10-CM

## 2025-02-15 LAB — CALCIUM BLD-MCNC: 9.8 MG/DL (ref 8.7–10.4)

## 2025-02-15 PROCEDURE — 84153 ASSAY OF PSA TOTAL: CPT

## 2025-02-15 PROCEDURE — 84154 ASSAY OF PSA FREE: CPT

## 2025-02-15 PROCEDURE — 36415 COLL VENOUS BLD VENIPUNCTURE: CPT

## 2025-02-15 PROCEDURE — 82310 ASSAY OF CALCIUM: CPT

## 2025-02-18 LAB
PROSTATE SPECIFIC AG: 3.3 NG/ML
PROSTATE SPECIFIC AG: 3.3 NG/ML

## (undated) DEVICE — LINE MNTR ADLT SET O2 INTMD

## (undated) DEVICE — 35 ML SYRINGE REGULAR TIP: Brand: MONOJECT

## (undated) DEVICE — MEDI-VAC NON-CONDUCTIVE SUCTION TUBING 6MM X 1.8M (6FT.) L: Brand: CARDINAL HEALTH

## (undated) DEVICE — Device: Brand: CUSTOM PROCEDURE KIT

## (undated) DEVICE — Device: Brand: DEFENDO AIR/WATER/SUCTION AND BIOPSY VALVE

## (undated) NOTE — LETTER
AUTHORIZATION FOR SURGICAL OPERATION OR OTHER PROCEDURE    1. I hereby authorize Dr. Meghan Lowery, and [facility] staff assigned to my case to perform the following operation and/or procedure at the [facility]:    _______________________________________________________________________________________________      2. My physician has explained the nature and purpose of the operation or other procedure, possible alternative methods of treatment, the risks involved, and the possibility of complication to me. I acknowledge that no guarantee has been made as to the result that may be obtained. 3.  I recognize that, during the course of this operation, or other procedure, unforseen conditions may necessitate additional or different procedure than those listed above. I, therefore, further authorize and request that the above named physician, his/her physician assistants or designees perform such procedures as are, in his/her professional opinion, necessary and desirable. 4.  Any tissue or organs removed in the operation or other procedure may be disposed of by and at the discretion of the [facility] and [facility]. 5.  I understand that in the event of a medical emergency, I will be transported by local paramedics to [facility] or other hospital emergency department. 6.  I certify that I have read and fully understand the above consent to operation and/or other procedure. 7.  I acknowledge that my physician has explained sedation/analgesia administration to me including the risks and benefits. I consent to the administration of sedation/analgesia as may be necessary or desirable in the judgement of my physician. Witness signature: ___________________________________________________ Date:  ______/______/_____                    Time:  ________ A. M.  P.M.        Patient Name:  ______________________________________________________  (please print)      Patient signature:  ___________________________________________________             Relationship to Patient:           []  Parent    Responsible person                          []  Spouse  In case of minor or                    [] Other  _____________   Incompetent name:  __________________________________________________                               (please print)      _____________      Responsible person  In case of minor or  Incompetent signature:  _______________________________________________    Statement of Physician  My signature below affirms that prior to the time of the procedure, I have explained to the patient and/or his/her guardian, the risks and benefits involved in the proposed treatment and any reasonable alternative to the proposed treatment. I have also explained the risks and benefits involved in the refusal of the proposed treatment and have answered the patient's questions.                         Date:  ______/______/_______  Provider                      Signature:  __________________________________________________________       Time:  ___________ A.M    P.M.

## (undated) NOTE — LETTER
1/6/2020    [address]            Dear Bj Dickerson,      Our records indicate that you are due for an appointment for a Colonoscopy in February 2020, or shortly there after, with Holden Santana